# Patient Record
Sex: MALE | Race: WHITE | Employment: OTHER | URBAN - METROPOLITAN AREA
[De-identification: names, ages, dates, MRNs, and addresses within clinical notes are randomized per-mention and may not be internally consistent; named-entity substitution may affect disease eponyms.]

---

## 2019-03-07 ENCOUNTER — HOSPITAL ENCOUNTER (OUTPATIENT)
Dept: GENERAL RADIOLOGY | Age: 74
Discharge: HOME | End: 2019-03-09
Payer: MEDICARE

## 2019-03-07 ENCOUNTER — HOSPITAL ENCOUNTER (OUTPATIENT)
Age: 74
Discharge: HOME | End: 2019-03-09
Payer: MEDICARE

## 2019-03-07 DIAGNOSIS — M25.562 LEFT KNEE PAIN, UNSPECIFIED CHRONICITY: ICD-10-CM

## 2019-03-07 PROCEDURE — 73564 X-RAY EXAM KNEE 4 OR MORE: CPT

## 2021-01-29 ENCOUNTER — IMMUNIZATION (OUTPATIENT)
Dept: PRIMARY CARE CLINIC | Age: 76
End: 2021-01-29
Payer: MEDICARE

## 2021-01-29 PROCEDURE — 91300 COVID-19, PFIZER VACCINE 30MCG/0.3ML DOSE: CPT | Performed by: PHYSICIAN ASSISTANT

## 2021-01-29 PROCEDURE — 0001A COVID-19, PFIZER VACCINE 30MCG/0.3ML DOSE: CPT | Performed by: PHYSICIAN ASSISTANT

## 2021-02-19 ENCOUNTER — IMMUNIZATION (OUTPATIENT)
Dept: PRIMARY CARE CLINIC | Age: 76
End: 2021-02-19
Payer: MEDICARE

## 2021-02-19 PROCEDURE — 91300 COVID-19, PFIZER VACCINE 30MCG/0.3ML DOSE: CPT | Performed by: CLINICAL NURSE SPECIALIST

## 2021-02-19 PROCEDURE — 0002A COVID-19, PFIZER VACCINE 30MCG/0.3ML DOSE: CPT | Performed by: CLINICAL NURSE SPECIALIST

## 2021-02-24 ENCOUNTER — HOSPITAL ENCOUNTER (OUTPATIENT)
Age: 76
Discharge: HOME | End: 2021-02-26
Payer: MEDICARE

## 2021-02-24 ENCOUNTER — HOSPITAL ENCOUNTER (OUTPATIENT)
Dept: ULTRASOUND IMAGING | Age: 76
Discharge: HOME | End: 2021-02-26
Payer: MEDICARE

## 2021-02-24 DIAGNOSIS — R33.9 BLADDER RETENTION: ICD-10-CM

## 2021-02-24 PROCEDURE — 76770 US EXAM ABDO BACK WALL COMP: CPT

## 2021-04-05 ENCOUNTER — HOSPITAL ENCOUNTER (OUTPATIENT)
Age: 76
Discharge: HOME | End: 2021-04-07
Payer: MEDICARE

## 2021-04-05 ENCOUNTER — HOSPITAL ENCOUNTER (OUTPATIENT)
Dept: ULTRASOUND IMAGING | Age: 76
Discharge: HOME | End: 2021-04-07
Payer: MEDICARE

## 2021-04-05 DIAGNOSIS — R33.9 RETENTION OF URINE: ICD-10-CM

## 2021-04-05 PROCEDURE — 76770 US EXAM ABDO BACK WALL COMP: CPT

## 2021-04-05 PROCEDURE — 76775 US EXAM ABDO BACK WALL LIM: CPT

## 2024-01-26 ENCOUNTER — HOSPITAL ENCOUNTER (OUTPATIENT)
Dept: RADIATION ONCOLOGY | Age: 79
Discharge: HOME | End: 2024-01-26
Payer: MEDICARE

## 2024-01-26 ENCOUNTER — TELEPHONE (OUTPATIENT)
Dept: CASE MANAGEMENT | Age: 79
End: 2024-01-26

## 2024-01-26 ENCOUNTER — TELEPHONE (OUTPATIENT)
Dept: RADIATION ONCOLOGY | Age: 79
End: 2024-01-26

## 2024-01-26 VITALS
SYSTOLIC BLOOD PRESSURE: 112 MMHG | DIASTOLIC BLOOD PRESSURE: 59 MMHG | RESPIRATION RATE: 18 BRPM | HEART RATE: 89 BPM | TEMPERATURE: 97.8 F | WEIGHT: 139.3 LBS | OXYGEN SATURATION: 96 %

## 2024-01-26 DIAGNOSIS — C15.5 MALIGNANT NEOPLASM OF LOWER THIRD OF ESOPHAGUS (HCC): Primary | ICD-10-CM

## 2024-01-26 PROCEDURE — 77334 RADIATION TREATMENT AID(S): CPT | Performed by: RADIOLOGY

## 2024-01-26 PROCEDURE — 77470 SPECIAL RADIATION TREATMENT: CPT | Performed by: RADIOLOGY

## 2024-01-26 PROCEDURE — 99205 OFFICE O/P NEW HI 60 MIN: CPT

## 2024-01-26 PROCEDURE — 99205 OFFICE O/P NEW HI 60 MIN: CPT | Performed by: RADIOLOGY

## 2024-01-26 RX ORDER — MEMANTINE HYDROCHLORIDE 5 MG/1
5 TABLET ORAL 2 TIMES DAILY
COMMUNITY

## 2024-01-26 NOTE — TELEPHONE ENCOUNTER
Met with patient and wife during his initial consultation with Dr Brewer for his Esophageal cancer diagnosis. Introduced myself and explained Nurse Navigator role with patients receiving treatment at our center. Patient was seen by Radiation and Medical Oncologies at [facility] and plan was determined to be for concurrent Chemo/Radiation. Patient prefers treatment closer to home so he was referred to our office for the Radiation part of treatment and [facility] for the Chemo part. Patient was seen by Dr Knight on 1/23 for local Medical Oncology. Patient was friendly and receptive. Patient is scheduled for EUS on 1/29 and state that Dr Knight would like to start Chemo on 2/7 but unsure if Radiation will be ready. Reviewed Radiation planning process. Answered questions regarding planning, scheduling, referrals and treatments to their apparent satisfaction. They deny any needs at this time but are questioning potential out of pocket costs for treatments. Discussed them contacting their insurance to see out of pocket costs for treatment and also will update Social Work for any additional information or resources. Next steps include EUS, CT simulation for Radiation planning and concurrent Chemo/Radiation treatments per Dr Brewer's recommendations and follow up care. Provided patient with literature  on [facility] Esophageal Basics, ACS Esophageal Cancer, [facility] Esophageal Resources and Stepping Out schedule. Reviewed resources available including Social Work and Dietician. Provided with my contact information and encouraged patient to call me with questions or concerns. Verbalizes understanding. Patient appreciative of visit. Will continue to follow.

## 2024-01-26 NOTE — TELEPHONE ENCOUNTER
SW met with pt and wife, Dasia, briefly prior to consultation with Dr. Brewer.  Pt declined any needs and stated having no stressors at this time.  Pt's wife stated that she might have some and SW introduced pt and wife to self and role of oncology social work.  Pt's wife took the information at this time and was thankful for SW taking time to speak with them.        Carl Miles MSW, GARETHW-S  Oncology Social Worker

## 2024-01-26 NOTE — PROGRESS NOTES
Radiation Oncology      Reagan Brewer III, MD MS DABR   [facility]      Referring Physician: Dr. JADYN Peña      Primary Care Physician:Fabian Rodriguez MD   Primary Oncologist: Dr. MER Knight      Diagnosis: cT2-T3 cNo cMo EGD pending esophageal cancer - adeno      Service:  Radiation Oncology consultation performed on 1/26/24        HPI:        ***      -----      Per [facility]:      Discussed case at the tumor board and general consensus is to offer him definitive concurrent chemoradiation per cross trial protocol and at the end of treatment to be reassessed to see if he is a surgical candidate or not.  If there is evidence of residual disease at the end of definitive concurrent radiation therapy (which is most likely to happen), we may offer him consolidation IO therapy with Nivolumab.    Patient has been seen by radiation oncology already. He will be treated locally at [place]. We will formulate his treatment plan and provided to the local cancer center was going to be treated.  Patient will be offered weekly carboplatin and Taxol for 5 weeks according to cross trial protocol concurrently with definitive radiation therapy.     Plan:   To proceed with definitive concurrent chemoradiation therapy locally.  Advised to keep his appointment for EUS for complete local staging.  We will send his medical records and treatment plan formulation to the local oncologist.  Patient will return to see me after definitive concurrent chemoradiation.  The risk and benefits of chemotherapy were discussed in detail. Literature information on carboplatin and Taxol was given to them and they verbalized understanding as well as agreed to treatment plan.        No past medical history on file.  -HTN  -WL  -dementia  -diabetes      No past surgical history on file.  -hernia  -colo  -TURP - abalative      Family History   Problem Relation Age of Onset   • Breast

## 2024-01-26 NOTE — TELEPHONE ENCOUNTER
Called [facility] and left a message for the Nurse with an update that patient was seen in our office for consult today with Dr Brewer and CT simulation for Radiation was completed same day. Once planning is complete we will coordinate with their office on concurrent Chemo/Radiation start date. Fax sent to their office with the information as well. Left my number for any questions.

## 2024-01-26 NOTE — PROGRESS NOTES
Jorge Rodriguez  1945 78 y.o.      Referring Physician: Dr. Amadou Peña    PCP: Fabian Rodriguez MD     Vitals:    01/26/24 0915   BP: (!) 112/59   Pulse: 89   Resp: 18   Temp: 97.8 °F (36.6 °C)   SpO2: 96%        Wt Readings from Last 3 Encounters:   01/26/24 63.2 kg (139 lb 4.8 oz)        There is no height or weight on file to calculate BMI.               Chief Complaint: No chief complaint on file.         Cancer Staging   No matching staging information was found for the patient.    Prior Radiation Therapy? NO    Concurrent Chemo/radiation? NO    Prior Chemotherapy? NO    Prior Hormonal Therapy? NO    Head and Neck Cancer? No, patient does NOT have HN cancer.          Current Outpatient Medications   Medication Sig Dispense Refill    memantine (NAMENDA) 5 MG tablet Take 1 tablet by mouth 2 times daily      sertraline (ZOLOFT) 50 MG tablet Take 1 tablet by mouth daily       No current facility-administered medications for this encounter.       No past medical history on file.    No past surgical history on file.    Family History   Problem Relation Age of Onset    Breast Cancer Mother     Cancer Father         throat       Social History     Socioeconomic History    Marital status:      Spouse name: Not on file    Number of children: 2    Years of education: Not on file    Highest education level: Not on file   Occupational History    Not on file   Tobacco Use    Smoking status: Former     Average packs/day: 1.3 packs/day for 59.1 years (78.0 ttl pk-yrs)     Types: Cigarettes     Start date: 1965    Smokeless tobacco: Never   Vaping Use    Vaping Use: Never used   Substance and Sexual Activity    Alcohol use: Yes     Comment: socially    Drug use: Never    Sexual activity: Not on file   Other Topics Concern    Not on file   Social History Narrative    Not on file     Social Determinants of Health     Financial Resource Strain: Not on file   Food Insecurity: Not on file   Transportation Needs: Not on

## 2024-01-29 PROBLEM — C15.5 MALIGNANT NEOPLASM OF LOWER THIRD OF ESOPHAGUS (HCC): Status: ACTIVE | Noted: 2024-01-29

## 2024-01-29 NOTE — ADDENDUM NOTE
Encounter addended by: Reagan Brewer III, MD on: 1/29/2024 4:45 PM   Actions taken: Problem List modified

## 2024-02-06 ENCOUNTER — TELEPHONE (OUTPATIENT)
Dept: CASE MANAGEMENT | Age: 79
End: 2024-02-06

## 2024-02-06 ENCOUNTER — HOSPITAL ENCOUNTER (OUTPATIENT)
Dept: RADIATION ONCOLOGY | Age: 79
Discharge: HOME | End: 2024-02-06
Payer: MEDICARE

## 2024-02-06 PROCEDURE — 77338 DESIGN MLC DEVICE FOR IMRT: CPT | Performed by: RADIOLOGY

## 2024-02-06 PROCEDURE — 77300 RADIATION THERAPY DOSE PLAN: CPT | Performed by: RADIOLOGY

## 2024-02-06 PROCEDURE — 77301 RADIOTHERAPY DOSE PLAN IMRT: CPT | Performed by: RADIOLOGY

## 2024-02-06 NOTE — TELEPHONE ENCOUNTER
Updated by Franklyn with appointment time for start of treatment tomorrow. Called and spoke with patient's wife Dasia. They are agreeable to come in for treatment tomorrow morning at 8:15am and will go for chemo treatment at the [facility] after Radiation.

## 2024-02-07 ENCOUNTER — HOSPITAL ENCOUNTER (OUTPATIENT)
Dept: RADIATION ONCOLOGY | Age: 79
Discharge: HOME | End: 2024-02-07
Payer: MEDICARE

## 2024-02-07 VITALS
SYSTOLIC BLOOD PRESSURE: 128 MMHG | WEIGHT: 142 LBS | TEMPERATURE: 97.6 F | HEART RATE: 74 BPM | DIASTOLIC BLOOD PRESSURE: 72 MMHG | RESPIRATION RATE: 18 BRPM | OXYGEN SATURATION: 99 %

## 2024-02-07 DIAGNOSIS — C15.9 MALIGNANT NEOPLASM OF ESOPHAGUS, UNSPECIFIED LOCATION (HCC): Primary | ICD-10-CM

## 2024-02-07 DIAGNOSIS — C15.8 MALIGNANT NEOPLASM OF OVERLAPPING SITES OF ESOPHAGUS (HCC): Primary | ICD-10-CM

## 2024-02-07 PROCEDURE — NBSRV NON-BILLABLE SERVICE: Performed by: RADIOLOGY

## 2024-02-07 PROCEDURE — 77386 HC NTSTY MODUL RAD TX DLVR CPLX: CPT | Performed by: RADIOLOGY

## 2024-02-07 NOTE — PROGRESS NOTES
DEPARTMENT OF RADIATION ONCOLOGY ON TREATMENT VISIT         2/7/2024      NAME:  Jorge Rodriguez    YOB: 1945    Diagnosis: esophageal cancer    SUBJECTIVE:   Jorge Rodriguez has now received fractionated external beam radiation therapy - ongoing.    Past medical, surgical, social and family histories reviewed and updated as indicated.    Pain: controlled    ALLERGIES:  Patient has no known allergies.         Current Outpatient Medications   Medication Sig Dispense Refill    memantine (NAMENDA) 5 MG tablet Take 1 tablet by mouth 2 times daily      sertraline (ZOLOFT) 50 MG tablet Take 1 tablet by mouth daily       No current facility-administered medications for this encounter.           OBJECTIVE:  Alert and fully ambulatory. Pleasant and conversant.        Physical Examination: General appearance - alert, well appearing, and in no distress.          Wt Readings from Last 3 Encounters:   02/07/24 64.4 kg (142 lb)   01/26/24 63.2 kg (139 lb 4.8 oz)         ASSESSMENT/PLAN:     Patient is tolerating treatments well with expected toxicities. RBA were reviewed prior to first fraction and PRN.    Current and planned dose reviewed. Goals of treatment and potential side effects were reviewed with the patient PRN. Treatment imaging has been personally reviewed for accuracy and precision.    Questions answered to apparent satisfaction.    Treatments will continue as planned.        Reagan Brewer III, MD MS DABR  Radiation Oncologist        [facility] ([facility]): [phone] /// FAX: [phone]  OLINDA Hitchcockman): [phone] /// FAX: [phone]  REGINA Verdugo): [phone] /// FAX: [phone]

## 2024-02-07 NOTE — PATIENT INSTRUCTIONS
Continue daily fractionated radiation therapy as scheduled. Please see weekly OTV note and intial consultation letter in EPIC for clinical details.        Reagan Brewer III, MD MS DABR    [facility]:  [phone]   FAX: [phone]  [facility]:  [phone]   FAX:    [phone]  [facility]:  [phone]   FAX:  [phone]  Email: [email or web address]

## 2024-02-07 NOTE — PROGRESS NOTES
Radiation Oncology          -chart reviewed  -imaging reviewed  -cont RT        Reagan Brewer III, MD MS [facility]  [facility]  Radiation Oncology  Cell: [phone]    [facility]:  [phone]   FAX: [phone]  [facility]:  [phone]   FAX:    [phone]  [facility]:  [phone]   FAX:  [phone]

## 2024-02-07 NOTE — PROGRESS NOTES
Jorge Rodriguez  2/7/2024  Wt Readings from Last 3 Encounters:   02/07/24 64.4 kg (142 lb)   01/26/24 63.2 kg (139 lb 4.8 oz)     There is no height or weight on file to calculate BMI.        Treatment Area:CTV esoph + LN     Patient was seen today for weekly visit.     Comfort Alteration  KPS:80%  Fatigue: None      Nutritional Alteration  Anorexia: No   Nausea: No   Vomiting: No       Elimination Alterations  Constipation: no  Diarrhea:  no    Skin Alteration   Sensation:no    Radiation Dermatitis:  no    Emotional  Coping: effective    Sexuality Alteration  na    Injury, potential bleeding or infection: no      No results found for: \"WBC\", \"HGB\", \"HCT\", \"PLT\"    /72   Pulse 74   Temp 97.6 °F (36.4 °C) (Temporal)   Resp 18   Wt 64.4 kg (142 lb)   SpO2 99%   BP within normal range? yes       Assessment/Plan: Pt completed 1/28fx and 180/5040cGy.      Luz Maria Restrepo RN

## 2024-02-08 ENCOUNTER — HOSPITAL ENCOUNTER (OUTPATIENT)
Dept: RADIATION ONCOLOGY | Age: 79
Discharge: HOME | End: 2024-02-08
Payer: MEDICARE

## 2024-02-08 PROCEDURE — 77386 HC NTSTY MODUL RAD TX DLVR CPLX: CPT | Performed by: RADIOLOGY

## 2024-02-09 ENCOUNTER — HOSPITAL ENCOUNTER (OUTPATIENT)
Dept: RADIATION ONCOLOGY | Age: 79
Discharge: HOME | End: 2024-02-09
Payer: MEDICARE

## 2024-02-09 PROCEDURE — 77386 HC NTSTY MODUL RAD TX DLVR CPLX: CPT | Performed by: RADIOLOGY

## 2024-02-12 ENCOUNTER — HOSPITAL ENCOUNTER (OUTPATIENT)
Dept: RADIATION ONCOLOGY | Age: 79
Discharge: HOME | End: 2024-02-12
Payer: MEDICARE

## 2024-02-12 PROCEDURE — 77386 HC NTSTY MODUL RAD TX DLVR CPLX: CPT | Performed by: RADIOLOGY

## 2024-02-12 PROCEDURE — 77014 CHG CT GUIDANCE RADIATION THERAPY FLDS PLACEMENT: CPT | Performed by: RADIOLOGY

## 2024-02-13 ENCOUNTER — HOSPITAL ENCOUNTER (OUTPATIENT)
Dept: RADIATION ONCOLOGY | Age: 79
Discharge: HOME | End: 2024-02-13
Payer: MEDICARE

## 2024-02-13 PROCEDURE — 77336 RADIATION PHYSICS CONSULT: CPT | Performed by: RADIOLOGY

## 2024-02-13 PROCEDURE — 77386 HC NTSTY MODUL RAD TX DLVR CPLX: CPT | Performed by: RADIOLOGY

## 2024-02-14 ENCOUNTER — HOSPITAL ENCOUNTER (OUTPATIENT)
Dept: RADIATION ONCOLOGY | Age: 79
Discharge: HOME | End: 2024-02-14
Payer: MEDICARE

## 2024-02-14 ENCOUNTER — TELEPHONE (OUTPATIENT)
Dept: RADIATION ONCOLOGY | Age: 79
End: 2024-02-14

## 2024-02-14 VITALS
TEMPERATURE: 97.6 F | WEIGHT: 137 LBS | SYSTOLIC BLOOD PRESSURE: 117 MMHG | RESPIRATION RATE: 18 BRPM | HEART RATE: 89 BPM | DIASTOLIC BLOOD PRESSURE: 63 MMHG | OXYGEN SATURATION: 98 %

## 2024-02-14 DIAGNOSIS — C15.8 MALIGNANT NEOPLASM OF OVERLAPPING SITES OF ESOPHAGUS (HCC): Primary | ICD-10-CM

## 2024-02-14 PROCEDURE — 77386 HC NTSTY MODUL RAD TX DLVR CPLX: CPT | Performed by: RADIOLOGY

## 2024-02-14 NOTE — PROGRESS NOTES
DEPARTMENT OF RADIATION ONCOLOGY ON TREATMENT VISIT         2/14/2024      NAME:  Jorge Rodriguez    YOB: 1945    Diagnosis: esophageal cancer    SUBJECTIVE:   Jorge Rodriguez has now received fractionated external beam radiation therapy - ongoing.    Past medical, surgical, social and family histories reviewed and updated as indicated.    Pain: controlled    ALLERGIES:  Patient has no known allergies.         Current Outpatient Medications   Medication Sig Dispense Refill    memantine (NAMENDA) 5 MG tablet Take 1 tablet by mouth 2 times daily      sertraline (ZOLOFT) 50 MG tablet Take 1 tablet by mouth daily       No current facility-administered medications for this encounter.           OBJECTIVE:  Alert and fully ambulatory. Pleasant and conversant.      Physical Examination: General appearance - alert, well appearing, and in no distress.          Wt Readings from Last 3 Encounters:   02/14/24 62.1 kg (137 lb)   02/07/24 64.4 kg (142 lb)   01/26/24 63.2 kg (139 lb 4.8 oz)         ASSESSMENT/PLAN:     Patient is tolerating treatments well with expected toxicities. RBA were reviewed prior to first fraction and PRN.    Current and planned dose reviewed. Goals of treatment and potential side effects were reviewed with the patient PRN. Treatment imaging has been personally reviewed for accuracy and precision.    Questions answered to apparent satisfaction.    Treatments will continue as planned.        Reagan Brewer III, MD MS DABR  Radiation Oncologist        [facility] ([facility]): [phone] /// FAX: [phone]  OLINDA Hitchcockman): [phone] /// FAX: [phone]  REGINA Verdugo): [phone] /// FAX: [phone]

## 2024-02-14 NOTE — PATIENT INSTRUCTIONS
Continue daily fractionated radiation therapy as scheduled. Please see weekly OTV note and intial consultation letter in EPIC for clinical details.        Reagan Brewer III, MD MS DABR    [facility]:  [phone]   FAX: [phone]  [facility]:  [phone]   FAX:    [phone]  [facility]:  [phone]   FAX:  [phone]  Email: [email or web address]

## 2024-02-14 NOTE — PROGRESS NOTES
Jorge Rodriguez  2/14/2024  Wt Readings from Last 3 Encounters:   02/14/24 62.1 kg (137 lb)   02/07/24 64.4 kg (142 lb)   01/26/24 63.2 kg (139 lb 4.8 oz)     There is no height or weight on file to calculate BMI.        Treatment Area:CTV esoph + LN     Patient was seen today for weekly visit.      Comfort Alteration  KPS:80%  Fatigue: None    Ventilation Alterations  Cough: No  Hemoptysis: No  Mucus Color: no  Dyspnea: No  O2 Sat: 80%    Nutritional Alteration  Anorexia: No  Nausea: No   Vomiting: No     Skin Alteration   Sensation:no    Radiation Dermatitis:  on    Mucous Membrane Alteration  Voice Changes/ Stridor/Larynx: no  Pharynx & Esophagus: no    Elimination Alterations  Constipation: no  Diarrhea:  no      Emotional  Coping: effective      Injury, potential bleeding or infection: no    Other:no    No results found for: \"WBC\", \"HGB\", \"HCT\", \"PLT\"      /63   Pulse 89   Temp 97.6 °F (36.4 °C) (Temporal)   Resp 18   Wt 62.1 kg (137 lb)   SpO2 98%   BP within normal range? yes       Assessment/Plan: Pt completed 6/28fx and 1080/5040cGy.    Luz Maria Restrepo RN

## 2024-02-14 NOTE — TELEPHONE ENCOUNTER
Jorge Rodriguez  2/14/2024  Ht Readings from Last 1 Encounters:   No data found for Ht     Wt Readings from Last 10 Encounters:   02/14/24 62.1 kg (137 lb)   02/07/24 64.4 kg (142 lb)   01/26/24 63.2 kg (139 lb 4.8 oz)     Assessment: Met with Jorge, his wife and son while in for OTV with Dr. Brewer. Jorge is receiving concurrent chemo rad tx for esophageal adenocarcinoma. He has completed 6/28 radiation treatments and is receiving chemo at the [facility]. He denied N/V/D/C, taste alterations, mouth sores, dysphagia and odynophagia. He said he is \"eating like a horse\". Utilizing Boost VHC 1 per day. Wife reports they have been doing good increasing calories, as he was as low as 132#. Contact information provided, and encouraged them to call with questions or concerns.     Weight change: 3.52% significant weight loss x 1 week, 1.86% weight loss x 1 month, 14.38% significant weight loss from usual body weight of 160-162#.   Appetite: Good appetite, eating meals and snacks. 1 Boost VHC usually daily  Nutritional Side Effects: None  Calculated Needs: 32-34 kcal/kg CBW = 1444-2092 kcal, 1.3-1.5 gm/kg/CBW = 80-95 gm pro, 1 ml/kcal = 0653-4527 ml fluids  Malnutrition Status: At high risk due to esophageal adenocarcinoma    Recommendations: Eat 5-6 meals per day. Drink at least 1 Boost VHC per day. May need more if weight loss occurs or difficult swallowing.     Vivi Osborne RD

## 2024-02-15 ENCOUNTER — HOSPITAL ENCOUNTER (OUTPATIENT)
Dept: RADIATION ONCOLOGY | Age: 79
Discharge: HOME | End: 2024-02-15
Payer: MEDICARE

## 2024-02-15 PROCEDURE — 77386 HC NTSTY MODUL RAD TX DLVR CPLX: CPT | Performed by: RADIOLOGY

## 2024-02-16 ENCOUNTER — HOSPITAL ENCOUNTER (OUTPATIENT)
Dept: RADIATION ONCOLOGY | Age: 79
Discharge: HOME | End: 2024-02-16
Payer: MEDICARE

## 2024-02-16 PROCEDURE — 77386 HC NTSTY MODUL RAD TX DLVR CPLX: CPT | Performed by: RADIOLOGY

## 2024-02-19 ENCOUNTER — HOSPITAL ENCOUNTER (OUTPATIENT)
Dept: RADIATION ONCOLOGY | Age: 79
Discharge: HOME | End: 2024-02-19
Payer: MEDICARE

## 2024-02-19 PROCEDURE — 77386 HC NTSTY MODUL RAD TX DLVR CPLX: CPT | Performed by: RADIOLOGY

## 2024-02-19 PROCEDURE — 77014 CHG CT GUIDANCE RADIATION THERAPY FLDS PLACEMENT: CPT | Performed by: RADIOLOGY

## 2024-02-20 ENCOUNTER — HOSPITAL ENCOUNTER (OUTPATIENT)
Dept: RADIATION ONCOLOGY | Age: 79
Discharge: HOME | End: 2024-02-20
Payer: MEDICARE

## 2024-02-20 PROCEDURE — 77336 RADIATION PHYSICS CONSULT: CPT | Performed by: RADIOLOGY

## 2024-02-20 PROCEDURE — 77427 RADIATION TX MANAGEMENT X5: CPT | Performed by: RADIOLOGY

## 2024-02-20 PROCEDURE — 77014 CHG CT GUIDANCE RADIATION THERAPY FLDS PLACEMENT: CPT | Performed by: RADIOLOGY

## 2024-02-20 PROCEDURE — 77386 HC NTSTY MODUL RAD TX DLVR CPLX: CPT | Performed by: RADIOLOGY

## 2024-02-21 ENCOUNTER — HOSPITAL ENCOUNTER (OUTPATIENT)
Dept: RADIATION ONCOLOGY | Age: 79
Discharge: HOME | End: 2024-02-21
Payer: MEDICARE

## 2024-02-21 VITALS
DIASTOLIC BLOOD PRESSURE: 66 MMHG | HEART RATE: 101 BPM | SYSTOLIC BLOOD PRESSURE: 108 MMHG | TEMPERATURE: 97.8 F | WEIGHT: 135.9 LBS | RESPIRATION RATE: 18 BRPM | OXYGEN SATURATION: 98 %

## 2024-02-21 DIAGNOSIS — C15.8 MALIGNANT NEOPLASM OF OVERLAPPING SITES OF ESOPHAGUS (HCC): Primary | ICD-10-CM

## 2024-02-21 PROCEDURE — 77386 HC NTSTY MODUL RAD TX DLVR CPLX: CPT | Performed by: RADIOLOGY

## 2024-02-21 PROCEDURE — 77014 CHG CT GUIDANCE RADIATION THERAPY FLDS PLACEMENT: CPT | Performed by: RADIOLOGY

## 2024-02-21 NOTE — PROGRESS NOTES
Jorge Rodriguez  2/21/2024  Wt Readings from Last 3 Encounters:   02/21/24 61.6 kg (135 lb 14.4 oz)   02/14/24 62.1 kg (137 lb)   02/07/24 64.4 kg (142 lb)     There is no height or weight on file to calculate BMI.        Treatment Area:CTV esoph + LN     Patient was seen today for weekly visit.      Comfort Alteration  KPS:70%  Fatigue: Mild    Ventilation Alterations  Cough: No  Hemoptysis: No  Mucus Color: na  Dyspnea: No  O2 Sat: 98%    Nutritional Alteration  Anorexia: Yes  Nausea: No   Vomiting: No     Skin Alteration   Sensation:no    Radiation Dermatitis:  no    Mucous Membrane Alteration  Voice Changes/ Stridor/Larynx: no  Pharynx & Esophagus: no    Elimination Alterations  Constipation: Yes  Diarrhea:  no      Emotional  Coping: effective      Injury, potential bleeding or infection: no    Other:no    No results found for: \"WBC\", \"HGB\", \"HCT\", \"PLT\"      /66   Pulse (!) 101   Temp 97.8 °F (36.6 °C) (Temporal)   Resp 18   Wt 61.6 kg (135 lb 14.4 oz)   SpO2 98%   BP within normal range? yes       Assessment/Plan: Pt completed 11/28fx and 1980/5040cGy.    Luz Maria Restrepo RN

## 2024-02-22 ENCOUNTER — HOSPITAL ENCOUNTER (OUTPATIENT)
Dept: RADIATION ONCOLOGY | Age: 79
Discharge: HOME | End: 2024-02-22
Payer: MEDICARE

## 2024-02-22 PROCEDURE — 77014 CHG CT GUIDANCE RADIATION THERAPY FLDS PLACEMENT: CPT | Performed by: RADIOLOGY

## 2024-02-22 PROCEDURE — 77386 HC NTSTY MODUL RAD TX DLVR CPLX: CPT | Performed by: RADIOLOGY

## 2024-02-23 ENCOUNTER — HOSPITAL ENCOUNTER (OUTPATIENT)
Dept: RADIATION ONCOLOGY | Age: 79
Discharge: HOME | End: 2024-02-23
Payer: MEDICARE

## 2024-02-23 PROCEDURE — 77386 HC NTSTY MODUL RAD TX DLVR CPLX: CPT | Performed by: RADIOLOGY

## 2024-02-23 NOTE — PROGRESS NOTES
DEPARTMENT OF RADIATION ONCOLOGY ON TREATMENT VISIT         2/23/2024      NAME:  Jorge Rodriguez    YOB: 1945    Diagnosis: esophageal cancer    SUBJECTIVE:   Jorge Rodriguez has now received fractionated external beam radiation therapy - ongoing.    Past medical, surgical, social and family histories reviewed and updated as indicated.    Pain: controlled    ALLERGIES:  Patient has no known allergies.         Current Outpatient Medications   Medication Sig Dispense Refill    memantine (NAMENDA) 5 MG tablet Take 1 tablet by mouth 2 times daily      sertraline (ZOLOFT) 50 MG tablet Take 1 tablet by mouth daily       No current facility-administered medications for this encounter.           OBJECTIVE:  Alert and fully ambulatory. Pleasant and conversant.        Physical Examination: General appearance - alert, well appearing, and in no distress.          Wt Readings from Last 3 Encounters:   02/21/24 61.6 kg (135 lb 14.4 oz)   02/14/24 62.1 kg (137 lb)   02/07/24 64.4 kg (142 lb)         ASSESSMENT/PLAN:     Patient is tolerating treatments well with expected toxicities. RBA were reviewed prior to first fraction and PRN.    Current and planned dose reviewed. Goals of treatment and potential side effects were reviewed with the patient PRN. Treatment imaging has been personally reviewed for accuracy and precision.    Questions answered to apparent satisfaction.    Treatments will continue as planned.        Reagan Brewer III, MD MS DABR  Radiation Oncologist        [facility] ([place]): [phone] /// FAX: [phone]  OLINDA Hitchcockman): [phone] /// FAX: [phone]  REGINA Verdugo): [phone] /// FAX: [phone]

## 2024-02-23 NOTE — PATIENT INSTRUCTIONS
Continue daily fractionated radiation therapy as scheduled. Please see weekly OTV note and intial consultation letter in EPIC for clinical details.        Reagan Brewer III, MD MS DABR    [facility]:  [phone]   FAX: [phone]  [facility]:  [phone]   FAX:    [phone]  [facility]:  [phone]   FAX:  [phone]  Email: [email or web address]

## 2024-02-26 ENCOUNTER — APPOINTMENT (OUTPATIENT)
Dept: RADIATION ONCOLOGY | Age: 79
End: 2024-02-26
Payer: MEDICARE

## 2024-02-26 PROCEDURE — 77014 CHG CT GUIDANCE RADIATION THERAPY FLDS PLACEMENT: CPT | Performed by: RADIOLOGY

## 2024-02-26 PROCEDURE — 77386 HC NTSTY MODUL RAD TX DLVR CPLX: CPT | Performed by: RADIOLOGY

## 2024-02-27 ENCOUNTER — APPOINTMENT (OUTPATIENT)
Dept: RADIATION ONCOLOGY | Age: 79
End: 2024-02-27
Payer: MEDICARE

## 2024-02-27 PROCEDURE — 77427 RADIATION TX MANAGEMENT X5: CPT | Performed by: RADIOLOGY

## 2024-02-27 PROCEDURE — 77386 HC NTSTY MODUL RAD TX DLVR CPLX: CPT | Performed by: RADIOLOGY

## 2024-02-27 PROCEDURE — 77014 CHG CT GUIDANCE RADIATION THERAPY FLDS PLACEMENT: CPT | Performed by: RADIOLOGY

## 2024-02-27 PROCEDURE — 77336 RADIATION PHYSICS CONSULT: CPT | Performed by: RADIOLOGY

## 2024-02-28 ENCOUNTER — APPOINTMENT (OUTPATIENT)
Dept: RADIATION ONCOLOGY | Age: 79
End: 2024-02-28
Payer: MEDICARE

## 2024-02-28 VITALS
DIASTOLIC BLOOD PRESSURE: 75 MMHG | WEIGHT: 134.4 LBS | SYSTOLIC BLOOD PRESSURE: 120 MMHG | OXYGEN SATURATION: 98 % | RESPIRATION RATE: 18 BRPM | HEART RATE: 112 BPM | TEMPERATURE: 97.6 F

## 2024-02-28 VITALS — HEIGHT: 67 IN | BODY MASS INDEX: 21.05 KG/M2

## 2024-02-28 DIAGNOSIS — C15.8 MALIGNANT NEOPLASM OF OVERLAPPING SITES OF ESOPHAGUS (HCC): Primary | ICD-10-CM

## 2024-02-28 PROCEDURE — 77014 CHG CT GUIDANCE RADIATION THERAPY FLDS PLACEMENT: CPT | Performed by: RADIOLOGY

## 2024-02-28 PROCEDURE — 77386 HC NTSTY MODUL RAD TX DLVR CPLX: CPT | Performed by: RADIOLOGY

## 2024-02-28 PROCEDURE — NBSRV NON-BILLABLE SERVICE: Performed by: RADIOLOGY

## 2024-02-28 NOTE — PROGRESS NOTES
DEPARTMENT OF RADIATION ONCOLOGY ON TREATMENT VISIT         2/28/2024      NAME:  Jorge Rodriguez    YOB: 1945    Diagnosis: esophageal cancer    SUBJECTIVE:   Jorge Rodriguez has now received fractionated external beam radiation therapy - ongoing.    Past medical, surgical, social and family histories reviewed and updated as indicated.    Pain: controlled    ALLERGIES:  Patient has no known allergies.         Current Outpatient Medications   Medication Sig Dispense Refill    memantine (NAMENDA) 5 MG tablet Take 1 tablet by mouth 2 times daily      sertraline (ZOLOFT) 50 MG tablet Take 1 tablet by mouth daily       No current facility-administered medications for this encounter.           OBJECTIVE:  Alert and fully ambulatory. Pleasant and conversant.      Physical Examination: General appearance - alert, well appearing, and in no distress.          Wt Readings from Last 3 Encounters:   02/28/24 61 kg (134 lb 6.4 oz)   02/21/24 61.6 kg (135 lb 14.4 oz)   02/14/24 62.1 kg (137 lb)         ASSESSMENT/PLAN:     Patient is tolerating treatments well with expected toxicities. RBA were reviewed prior to first fraction and PRN.    Current and planned dose reviewed. Goals of treatment and potential side effects were reviewed with the patient PRN. Treatment imaging has been personally reviewed for accuracy and precision.    Questions answered to apparent satisfaction.    Treatments will continue as planned.      Reagan Brewer III, MD MS DABR  Radiation Oncologist        [facility] ([facility]): [phone] /// FAX: [phone]  OLINDA Hitchcockman): [phone] /// FAX: [phone]  NORY Kartik): [phone] /// FAX: [phone]

## 2024-02-28 NOTE — PROGRESS NOTES
Jorge Rodriguez  2/28/2024  Ht Readings from Last 1 Encounters:   02/28/24 1.702 m (5' 7\")     Wt Readings from Last 10 Encounters:   02/28/24 61 kg (134 lb 6.4 oz)   02/21/24 61.6 kg (135 lb 14.4 oz)   02/14/24 62.1 kg (137 lb)   02/07/24 64.4 kg (142 lb)   01/26/24 63.2 kg (139 lb 4.8 oz)     BMI: 21.1    Assessment: Met w/ pt and wife, Dasia, during weekly visit. Pt has lost 1# over last week, overall 5# loss over last month. He reports anorexia w/ lack of hunger cues. Discussed strategies for improving overall nutritional intake. Pt had previously been drinking 1 Boost VHC daily, but has recently stopped this as is had become monotonous. He is consuming 3 small meals/day, minimal snacking. Encouraged more consistent PO intake w/ snacking as tolerated. Provided w/ samples of Ensure Complete and Ensure Clear for trial. Discussed benefits and drawbacks of variety of ONS options. Provided pt w/ resources to increase kcal/pro intake. Pt and Dasia receptive to ideas, appreciative of assistance. Encouraged to contact this clinician as needed. Will follow PRN.    Weight change: -5# x1 month  Appetite: fair-poor  Nutritional Side Effects: anorexia, early satiety  Calculated Needs: 30-32 kcal/kg CBW =  kcal, 1.3-1.5 gm/kg CBW = 80-90 gm pro, 1 ml/kcal =  ml fluids  Malnutrition Status: Moderate  Nutrition Diagnosis: Moderate malnutrition r/t esophageal CA AEB moderate fat and muscle loss.    Recommendations: Pt to consume at least 6 small meals/nutrient dense snacks daily. To add ONS of choice at least 1x daily    Olena Layton, MS, RD, LD

## 2024-02-28 NOTE — PATIENT INSTRUCTIONS
Continue daily fractionated radiation therapy as scheduled. Please see weekly OTV note and intial consultation letter in EPIC for clinical details.        Reagan Brewer III, MD MS DABR    [facility]:  [phone]   FAX: [phone]  [facility]:  [phone]   FAX:    [phone]  [facility]:  [phone]   FAX:  [phone]  Email: [email or web address]

## 2024-02-28 NOTE — PROGRESS NOTES
Jorge Rodriguez  2/28/2024  Wt Readings from Last 3 Encounters:   02/28/24 61 kg (134 lb 6.4 oz)   02/21/24 61.6 kg (135 lb 14.4 oz)   02/14/24 62.1 kg (137 lb)     There is no height or weight on file to calculate BMI.        Treatment Area:CTV esoph    Patient was seen today for weekly visit.      Comfort Alteration  KPS:80%  Fatigue: Moderate    Ventilation Alterations  Cough: No  Hemoptysis: No  Mucus Color: no  Dyspnea: No  O2 Sat: 98%    Nutritional Alteration  Anorexia: No  Nausea: No   Vomiting: No     Skin Alteration   Sensation:no    Radiation Dermatitis:  no    Mucous Membrane Alteration  Voice Changes/ Stridor/Larynx: no  Pharynx & Esophagus: no    Elimination Alterations  Constipation: yes  Diarrhea:  no      Emotional  Coping: effective      Injury, potential bleeding or infection: no    Other:no    No results found for: \"WBC\", \"HGB\", \"HCT\", \"PLT\"      /75   Pulse (!) 112   Temp 97.6 °F (36.4 °C) (Temporal)   Resp 18   Wt 61 kg (134 lb 6.4 oz)   SpO2 98%   BP within normal range? yes         Assessment/Plan: Pt completed 16/28fx and 2880/5040cGy.    Luz Maria Restrepo RN

## 2024-02-29 ENCOUNTER — APPOINTMENT (OUTPATIENT)
Dept: RADIATION ONCOLOGY | Age: 79
End: 2024-02-29
Payer: MEDICARE

## 2024-02-29 PROCEDURE — 77386 HC NTSTY MODUL RAD TX DLVR CPLX: CPT | Performed by: RADIOLOGY

## 2024-02-29 PROCEDURE — 77014 CHG CT GUIDANCE RADIATION THERAPY FLDS PLACEMENT: CPT | Performed by: RADIOLOGY

## 2024-03-01 ENCOUNTER — HOSPITAL ENCOUNTER (OUTPATIENT)
Dept: RADIATION ONCOLOGY | Age: 79
Discharge: HOME | End: 2024-03-01
Payer: MEDICARE

## 2024-03-01 PROCEDURE — 77386 HC NTSTY MODUL RAD TX DLVR CPLX: CPT | Performed by: RADIOLOGY

## 2024-03-04 ENCOUNTER — HOSPITAL ENCOUNTER (OUTPATIENT)
Dept: RADIATION ONCOLOGY | Age: 79
Discharge: HOME | End: 2024-03-04
Payer: MEDICARE

## 2024-03-04 VITALS
TEMPERATURE: 97.6 F | HEART RATE: 93 BPM | OXYGEN SATURATION: 97 % | RESPIRATION RATE: 18 BRPM | DIASTOLIC BLOOD PRESSURE: 57 MMHG | SYSTOLIC BLOOD PRESSURE: 99 MMHG

## 2024-03-04 PROCEDURE — 77386 HC NTSTY MODUL RAD TX DLVR CPLX: CPT | Performed by: RADIOLOGY

## 2024-03-04 PROCEDURE — 77014 CHG CT GUIDANCE RADIATION THERAPY FLDS PLACEMENT: CPT | Performed by: RADIOLOGY

## 2024-03-05 ENCOUNTER — HOSPITAL ENCOUNTER (OUTPATIENT)
Dept: RADIATION ONCOLOGY | Age: 79
Discharge: HOME | End: 2024-03-05
Payer: MEDICARE

## 2024-03-05 PROCEDURE — 77427 RADIATION TX MANAGEMENT X5: CPT | Performed by: RADIOLOGY

## 2024-03-05 PROCEDURE — 77336 RADIATION PHYSICS CONSULT: CPT | Performed by: RADIOLOGY

## 2024-03-05 PROCEDURE — 77386 HC NTSTY MODUL RAD TX DLVR CPLX: CPT | Performed by: RADIOLOGY

## 2024-03-05 PROCEDURE — 77014 CHG CT GUIDANCE RADIATION THERAPY FLDS PLACEMENT: CPT | Performed by: RADIOLOGY

## 2024-03-06 ENCOUNTER — HOSPITAL ENCOUNTER (OUTPATIENT)
Dept: RADIATION ONCOLOGY | Age: 79
Discharge: HOME | End: 2024-03-06
Payer: MEDICARE

## 2024-03-06 VITALS
WEIGHT: 133 LBS | OXYGEN SATURATION: 98 % | RESPIRATION RATE: 18 BRPM | HEART RATE: 109 BPM | TEMPERATURE: 97.2 F | SYSTOLIC BLOOD PRESSURE: 103 MMHG | BODY MASS INDEX: 20.83 KG/M2 | DIASTOLIC BLOOD PRESSURE: 77 MMHG

## 2024-03-06 DIAGNOSIS — C15.8 MALIGNANT NEOPLASM OF OVERLAPPING SITES OF ESOPHAGUS (HCC): Primary | ICD-10-CM

## 2024-03-06 PROCEDURE — 77014 CHG CT GUIDANCE RADIATION THERAPY FLDS PLACEMENT: CPT | Performed by: RADIOLOGY

## 2024-03-06 PROCEDURE — 77386 HC NTSTY MODUL RAD TX DLVR CPLX: CPT | Performed by: RADIOLOGY

## 2024-03-06 PROCEDURE — NBSRV NON-BILLABLE SERVICE: Performed by: RADIOLOGY

## 2024-03-06 NOTE — PROGRESS NOTES
Jorge Rodriguez  3/6/2024  Wt Readings from Last 3 Encounters:   03/06/24 60.3 kg (133 lb)   02/28/24 61 kg (134 lb 6.4 oz)   02/21/24 61.6 kg (135 lb 14.4 oz)     Body mass index is 20.83 kg/m².        Treatment Area:CTV esoph    Patient was seen today for weekly visit.     Comfort Alteration  KPS:90%  Fatigue: Moderate      Nutritional Alteration  Anorexia: Yes   Nausea: No   Vomiting: No       Elimination Alterations  Constipation: no  Diarrhea:  no    Skin Alteration   Sensation:good    Radiation Dermatitis:  na    Emotional  Coping: effective    Sexuality Alteration  na    Injury, potential bleeding or infection: na      No results found for: \"WBC\", \"HGB\", \"HCT\", \"PLT\"    /77   Pulse (!) 109   Temp 97.2 °F (36.2 °C) (Skin)   Resp 18   Wt 60.3 kg (133 lb)   SpO2 98%   BMI 20.83 kg/m²   BP within normal range? yes           Assessment/Plan:21/28fx  3780/5040cGy and tolerating well      Darby Law RN

## 2024-03-06 NOTE — PROGRESS NOTES
DEPARTMENT OF RADIATION ONCOLOGY ON TREATMENT VISIT         3/6/2024      NAME:  Jorge Rodriguez    YOB: 1945    Diagnosis: esophageal cancer    SUBJECTIVE:   Jorge Rodriguez has now received fractionated external beam radiation therapy - ongoing.    Past medical, surgical, social and family histories reviewed and updated as indicated.    Pain: controlled    ALLERGIES:  Patient has no known allergies.         Current Outpatient Medications   Medication Sig Dispense Refill    memantine (NAMENDA) 5 MG tablet Take 1 tablet by mouth 2 times daily      sertraline (ZOLOFT) 50 MG tablet Take 1 tablet by mouth daily       No current facility-administered medications for this encounter.           OBJECTIVE:  Alert and fully ambulatory. Pleasant and conversant.      Physical Examination: General appearance - alert, well appearing, and in no distress.          Wt Readings from Last 3 Encounters:   03/06/24 60.3 kg (133 lb)   02/28/24 61 kg (134 lb 6.4 oz)   02/21/24 61.6 kg (135 lb 14.4 oz)         ASSESSMENT/PLAN:     Patient is tolerating treatments well with expected toxicities. RBA were reviewed prior to first fraction and PRN.    Current and planned dose reviewed. Goals of treatment and potential side effects were reviewed with the patient PRN. Treatment imaging has been personally reviewed for accuracy and precision.    Questions answered to apparent satisfaction.    Treatments will continue as planned.        Reagan Brewer III, MD MS DABR  Radiation Oncologist        [facility] ([facility]): [phone] /// FAX: [phone]  OLINDA Hitchcockman): [phone] /// FAX: [phone]  NORY Kartik): [phone] /// FAX: [phone]

## 2024-03-06 NOTE — PATIENT INSTRUCTIONS
Continue daily fractionated radiation therapy as scheduled. Please see weekly OTV note and intial consultation letter in EPIC for clinical details.        Reagan Brewer III, MD MS DABR    [facility]:  [phone]   FAX: [phone]  [facility]:  [phone]   FAX:    [phone]  [facility]:  [phone]   FAX:  [phone]  Email: [email or web address]

## 2024-03-07 ENCOUNTER — HOSPITAL ENCOUNTER (OUTPATIENT)
Dept: RADIATION ONCOLOGY | Age: 79
Discharge: HOME | End: 2024-03-07
Payer: MEDICARE

## 2024-03-07 PROCEDURE — 77014 CHG CT GUIDANCE RADIATION THERAPY FLDS PLACEMENT: CPT | Performed by: RADIOLOGY

## 2024-03-07 PROCEDURE — 77386 HC NTSTY MODUL RAD TX DLVR CPLX: CPT | Performed by: RADIOLOGY

## 2024-03-08 ENCOUNTER — HOSPITAL ENCOUNTER (OUTPATIENT)
Dept: RADIATION ONCOLOGY | Age: 79
Discharge: HOME | End: 2024-03-08
Payer: MEDICARE

## 2024-03-08 PROCEDURE — 77386 HC NTSTY MODUL RAD TX DLVR CPLX: CPT | Performed by: RADIOLOGY

## 2024-03-11 ENCOUNTER — APPOINTMENT (OUTPATIENT)
Dept: RADIATION ONCOLOGY | Age: 79
End: 2024-03-11
Payer: MEDICARE

## 2024-03-12 ENCOUNTER — APPOINTMENT (OUTPATIENT)
Dept: RADIATION ONCOLOGY | Age: 79
End: 2024-03-12
Payer: MEDICARE

## 2024-03-12 PROCEDURE — 77386 HC NTSTY MODUL RAD TX DLVR CPLX: CPT | Performed by: RADIOLOGY

## 2024-03-12 PROCEDURE — 77336 RADIATION PHYSICS CONSULT: CPT | Performed by: RADIOLOGY

## 2024-03-12 PROCEDURE — 77427 RADIATION TX MANAGEMENT X5: CPT | Performed by: RADIOLOGY

## 2024-03-12 PROCEDURE — 77014 CHG CT GUIDANCE RADIATION THERAPY FLDS PLACEMENT: CPT | Performed by: RADIOLOGY

## 2024-03-13 ENCOUNTER — APPOINTMENT (OUTPATIENT)
Dept: RADIATION ONCOLOGY | Age: 79
End: 2024-03-13
Payer: MEDICARE

## 2024-03-13 VITALS
HEART RATE: 116 BPM | OXYGEN SATURATION: 98 % | RESPIRATION RATE: 18 BRPM | SYSTOLIC BLOOD PRESSURE: 98 MMHG | TEMPERATURE: 98.2 F | DIASTOLIC BLOOD PRESSURE: 60 MMHG | WEIGHT: 131 LBS | BODY MASS INDEX: 20.52 KG/M2

## 2024-03-13 DIAGNOSIS — C80.1 CANCER (HCC): Primary | ICD-10-CM

## 2024-03-13 PROCEDURE — 77386 HC NTSTY MODUL RAD TX DLVR CPLX: CPT | Performed by: RADIOLOGY

## 2024-03-13 PROCEDURE — 77014 CHG CT GUIDANCE RADIATION THERAPY FLDS PLACEMENT: CPT | Performed by: RADIOLOGY

## 2024-03-13 PROCEDURE — NBSRV NON-BILLABLE SERVICE: Performed by: RADIOLOGY

## 2024-03-13 NOTE — PATIENT INSTRUCTIONS
Continue daily fractionated radiation therapy as scheduled. Please see weekly OTV note and intial consultation letter in EPIC for clinical details.        Reagan Brewer III, MD MS DABR    [facility]:  [phone]   FAX: [phone]  [facility]:  [phone]   FAX:    [phone]  [facility]:  [phone]   FAX:  [phone]  Email: [email or web address]

## 2024-03-13 NOTE — PROGRESS NOTES
Jorge Rodriguez  3/13/2024  Wt Readings from Last 3 Encounters:   03/13/24 59.4 kg (131 lb)   03/06/24 60.3 kg (133 lb)   02/28/24 61 kg (134 lb 6.4 oz)     Body mass index is 20.52 kg/m².        Treatment Area:CTV esopha    Patient was seen today for weekly visit.     Comfort Alteration  KPS:90%  Fatigue: Severe      Nutritional Alteration  Anorexia: No   Nausea: No   Vomiting: No       Elimination Alterations  Constipation: yes  Diarrhea:  yes    Skin Alteration   Sensation:red and using lotion x2    Radiation Dermatitis:  yes    Emotional  Coping: effective    Sexuality Alteration  na    Injury, potential bleeding or infection: na      No results found for: \"WBC\", \"HGB\", \"HCT\", \"PLT\"    BP 98/60   Pulse (!) 116   Temp 98.2 °F (36.8 °C) (Skin)   Resp 18   Wt 59.4 kg (131 lb)   SpO2 98%   BMI 20.52 kg/m²   BP within normal range? yes           Assessment/Plan:26/28fx 4680/5040cGY and tolerating      Darby Law RN

## 2024-03-13 NOTE — PROGRESS NOTES
DEPARTMENT OF RADIATION ONCOLOGY ON TREATMENT VISIT         3/13/2024      NAME:  Jorge Rodriguez    YOB: 1945    Diagnosis: esophageal cancer    SUBJECTIVE:   Jorge Rodriguez has now received fractionated external beam radiation therapy - ongoing.    Past medical, surgical, social and family histories reviewed and updated as indicated.    Pain: controlled    ALLERGIES:  Patient has no known allergies.         Current Outpatient Medications   Medication Sig Dispense Refill    memantine (NAMENDA) 5 MG tablet Take 1 tablet by mouth 2 times daily      sertraline (ZOLOFT) 50 MG tablet Take 1 tablet by mouth daily       No current facility-administered medications for this encounter.           OBJECTIVE:  Alert and fully ambulatory. Pleasant and conversant.        Physical Examination: General appearance - alert, well appearing, and in no distress.           Wt Readings from Last 3 Encounters:   03/13/24 59.4 kg (131 lb)   03/06/24 60.3 kg (133 lb)   02/28/24 61 kg (134 lb 6.4 oz)         ASSESSMENT/PLAN:     Patient is tolerating treatments well with expected toxicities. RBA were reviewed prior to first fraction and PRN.    Current and planned dose reviewed. Goals of treatment and potential side effects were reviewed with the patient PRN. Treatment imaging has been personally reviewed for accuracy and precision.    Questions answered to apparent satisfaction.    Treatments will continue as planned.        Reagan Brewer III, MD MS DABR  Radiation Oncologist        [facility] ([facility]): [phone] /// FAX: [phone]  OLINDA Hitchcockman): [phone] /// FAX: [phone]  REGINA Verdugo): [phone] /// FAX: [phone]

## 2024-03-14 ENCOUNTER — APPOINTMENT (OUTPATIENT)
Dept: RADIATION ONCOLOGY | Age: 79
End: 2024-03-14
Payer: MEDICARE

## 2024-03-14 PROCEDURE — 77014 CHG CT GUIDANCE RADIATION THERAPY FLDS PLACEMENT: CPT | Performed by: RADIOLOGY

## 2024-03-14 PROCEDURE — 77386 HC NTSTY MODUL RAD TX DLVR CPLX: CPT | Performed by: RADIOLOGY

## 2024-03-14 NOTE — PROGRESS NOTES
Wt Readings from Last 3 Encounters:   03/13/24 59.4 kg (131 lb)   03/06/24 60.3 kg (133 lb)   02/28/24 61 kg (134 lb 6.4 oz)     Met w/ pt and wife, Dasia, prior to radiation treatment. Pt has lost additional 3# over last 2 weeks since last oncology nutrition assessment. He reports anorexia, has been trying to eat but is looking forward to completion of treatment tomorrow. Encouraged pt to discuss IV hydration options w/ medical oncologist as he continues to report hypotension and dizziness. Pt was instructed to contact PCP regarding BP as well. Pt continues to try to consume ONS as able. He denies any additional needs at this time. Will follow PRN.  Electronically signed by Olena Layton MS, RD, LD on 3/14/2024 at 5:16 PM

## 2024-03-15 ENCOUNTER — APPOINTMENT (OUTPATIENT)
Dept: RADIATION ONCOLOGY | Age: 79
End: 2024-03-15
Payer: MEDICARE

## 2024-03-15 PROCEDURE — 77386 HC NTSTY MODUL RAD TX DLVR CPLX: CPT | Performed by: RADIOLOGY

## 2024-03-15 PROCEDURE — 77336 RADIATION PHYSICS CONSULT: CPT | Performed by: RADIOLOGY

## 2024-03-15 NOTE — PROGRESS NOTES
Patient completed his radiation therapy treatments today. Rad onc RN gave patient, wife, and two sons were given his discharge instructions, follow up appt, and certificate of completion. Patient verbalized understanding. All questions/concerns were answered from a nursing perspective.